# Patient Record
Sex: FEMALE | Race: WHITE | Employment: FULL TIME | URBAN - METROPOLITAN AREA
[De-identification: names, ages, dates, MRNs, and addresses within clinical notes are randomized per-mention and may not be internally consistent; named-entity substitution may affect disease eponyms.]

---

## 2023-11-22 ENCOUNTER — HOSPITAL ENCOUNTER (EMERGENCY)
Age: 23
Discharge: HOME | End: 2023-11-22
Payer: COMMERCIAL

## 2023-11-22 ENCOUNTER — APPOINTMENT (OUTPATIENT)
Dept: CT IMAGING | Age: 23
End: 2023-11-22
Payer: COMMERCIAL

## 2023-11-22 VITALS
SYSTOLIC BLOOD PRESSURE: 110 MMHG | BODY MASS INDEX: 26.06 KG/M2 | RESPIRATION RATE: 16 BRPM | HEIGHT: 67 IN | DIASTOLIC BLOOD PRESSURE: 77 MMHG | WEIGHT: 166 LBS | HEART RATE: 90 BPM | TEMPERATURE: 99.3 F | OXYGEN SATURATION: 98 %

## 2023-11-22 DIAGNOSIS — R82.71 BACTERIURIA: ICD-10-CM

## 2023-11-22 DIAGNOSIS — R10.9 LEFT FLANK PAIN: Primary | ICD-10-CM

## 2023-11-22 DIAGNOSIS — N30.91 HEMORRHAGIC CYSTITIS: ICD-10-CM

## 2023-11-22 LAB
ANION GAP SERPL CALC-SCNC: 12 MMOL/L (ref 2–11)
APPEARANCE UR: ABNORMAL
BACTERIA URNS QL MICRO: ABNORMAL /HPF
BASOPHILS # BLD: 0 K/UL (ref 0–0.2)
BASOPHILS NFR BLD: 0 % (ref 0–2)
BILIRUB UR QL: NEGATIVE
BUN SERPL-MCNC: 10 MG/DL (ref 6–23)
CALCIUM SERPL-MCNC: 9.5 MG/DL (ref 8.3–10.4)
CASTS URNS QL MICRO: 0 /LPF
CHLORIDE SERPL-SCNC: 102 MMOL/L (ref 98–107)
CO2 SERPL-SCNC: 26 MMOL/L (ref 21–32)
COLOR UR: ABNORMAL
CREAT SERPL-MCNC: 0.63 MG/DL (ref 0.6–1)
CRYSTALS URNS QL MICRO: 0 /LPF
DIFFERENTIAL METHOD BLD: ABNORMAL
EOSINOPHIL # BLD: 0 K/UL (ref 0–0.8)
EOSINOPHIL NFR BLD: 0 % (ref 0.5–7.8)
EPI CELLS #/AREA URNS HPF: ABNORMAL /HPF
ERYTHROCYTE [DISTWIDTH] IN BLOOD BY AUTOMATED COUNT: 12.2 % (ref 11.9–14.6)
GLUCOSE SERPL-MCNC: 119 MG/DL (ref 65–100)
GLUCOSE UR STRIP.AUTO-MCNC: NEGATIVE MG/DL
HCG UR QL: NEGATIVE
HCT VFR BLD AUTO: 42.8 % (ref 35.8–46.3)
HGB BLD-MCNC: 14.9 G/DL (ref 11.7–15.4)
HGB UR QL STRIP: ABNORMAL
IMM GRANULOCYTES # BLD AUTO: 0 K/UL (ref 0–0.5)
IMM GRANULOCYTES NFR BLD AUTO: 0 % (ref 0–5)
KETONES UR QL STRIP.AUTO: NEGATIVE MG/DL
LEUKOCYTE ESTERASE UR QL STRIP.AUTO: ABNORMAL
LYMPHOCYTES # BLD: 1.2 K/UL (ref 0.5–4.6)
LYMPHOCYTES NFR BLD: 9 % (ref 13–44)
MCH RBC QN AUTO: 31 PG (ref 26.1–32.9)
MCHC RBC AUTO-ENTMCNC: 34.8 G/DL (ref 31.4–35)
MCV RBC AUTO: 89 FL (ref 82–102)
MONOCYTES # BLD: 0.6 K/UL (ref 0.1–1.3)
MONOCYTES NFR BLD: 4 % (ref 4–12)
MUCOUS THREADS URNS QL MICRO: 0 /LPF
NEUTS SEG # BLD: 12.2 K/UL (ref 1.7–8.2)
NEUTS SEG NFR BLD: 87 % (ref 43–78)
NITRITE UR QL STRIP.AUTO: POSITIVE
NRBC # BLD: 0 K/UL (ref 0–0.2)
OTHER OBSERVATIONS: ABNORMAL
PH UR STRIP: 7 (ref 5–9)
PLATELET # BLD AUTO: 217 K/UL (ref 150–450)
PMV BLD AUTO: 10.2 FL (ref 9.4–12.3)
POTASSIUM SERPL-SCNC: 4 MMOL/L (ref 3.5–5.1)
PROT UR STRIP-MCNC: 100 MG/DL
RBC # BLD AUTO: 4.81 M/UL (ref 4.05–5.2)
RBC #/AREA URNS HPF: ABNORMAL /HPF
SODIUM SERPL-SCNC: 140 MMOL/L (ref 133–143)
SP GR UR REFRACTOMETRY: 1.02 (ref 1–1.02)
UROBILINOGEN UR QL STRIP.AUTO: 0.2 EU/DL (ref 0.2–1)
WBC # BLD AUTO: 14.1 K/UL (ref 4.3–11.1)
WBC URNS QL MICRO: ABNORMAL /HPF

## 2023-11-22 PROCEDURE — 74176 CT ABD & PELVIS W/O CONTRAST: CPT

## 2023-11-22 PROCEDURE — 2580000003 HC RX 258

## 2023-11-22 PROCEDURE — 85025 COMPLETE CBC W/AUTO DIFF WBC: CPT

## 2023-11-22 PROCEDURE — 81001 URINALYSIS AUTO W/SCOPE: CPT

## 2023-11-22 PROCEDURE — 96365 THER/PROPH/DIAG IV INF INIT: CPT

## 2023-11-22 PROCEDURE — 99284 EMERGENCY DEPT VISIT MOD MDM: CPT

## 2023-11-22 PROCEDURE — 96375 TX/PRO/DX INJ NEW DRUG ADDON: CPT

## 2023-11-22 PROCEDURE — 80048 BASIC METABOLIC PNL TOTAL CA: CPT

## 2023-11-22 PROCEDURE — 81025 URINE PREGNANCY TEST: CPT

## 2023-11-22 PROCEDURE — 87086 URINE CULTURE/COLONY COUNT: CPT

## 2023-11-22 PROCEDURE — 6360000002 HC RX W HCPCS

## 2023-11-22 PROCEDURE — 96376 TX/PRO/DX INJ SAME DRUG ADON: CPT

## 2023-11-22 RX ORDER — FLUCONAZOLE 150 MG/1
150 TABLET ORAL ONCE
Qty: 1 TABLET | Refills: 0 | Status: SHIPPED | OUTPATIENT
Start: 2023-11-22 | End: 2023-11-22

## 2023-11-22 RX ORDER — CEPHALEXIN 500 MG/1
500 CAPSULE ORAL 3 TIMES DAILY
Qty: 21 CAPSULE | Refills: 0 | Status: SHIPPED | OUTPATIENT
Start: 2023-11-22 | End: 2023-11-29

## 2023-11-22 RX ORDER — KETOROLAC TROMETHAMINE 15 MG/ML
15 INJECTION, SOLUTION INTRAMUSCULAR; INTRAVENOUS
Status: COMPLETED | OUTPATIENT
Start: 2023-11-22 | End: 2023-11-22

## 2023-11-22 RX ADMIN — CEFTRIAXONE 1000 MG: 1 INJECTION, POWDER, FOR SOLUTION INTRAMUSCULAR; INTRAVENOUS at 14:32

## 2023-11-22 RX ADMIN — KETOROLAC TROMETHAMINE 15 MG: 15 INJECTION, SOLUTION INTRAMUSCULAR; INTRAVENOUS at 13:43

## 2023-11-22 ASSESSMENT — PAIN - FUNCTIONAL ASSESSMENT: PAIN_FUNCTIONAL_ASSESSMENT: 0-10

## 2023-11-22 ASSESSMENT — PAIN SCALES - GENERAL: PAINLEVEL_OUTOF10: 5

## 2023-11-22 NOTE — DISCHARGE INSTRUCTIONS
There is no sign of kidney infection or kidney stones on your CT scan. I am treating you at home with antibiotics for urinary tract infection. Take the full course of Keflex prescribed you 3 times daily for the next 7 days. Take the fluconazole 1 time after you have finished the antibiotics. Return if you have any new or worsening symptoms. Your prescriptions were sent to [facility] on [id number]

## 2023-11-22 NOTE — ED PROVIDER NOTES
Emergency Department Provider Note       PCP: No primary care provider on file. Age: 20 y.o. Sex: female     DISPOSITION Decision To Discharge 11/22/2023 03:23:44 PM       ICD-10-CM    1. Left flank pain  R10.9       2. Bacteriuria  R82.71       3. Hemorrhagic cystitis  N30.91           Medical Decision Making     Complexity of Problems Addressed:  1 acute problem    Data Reviewed and Analyzed:   I independently ordered and reviewed each unique test.           I interpreted the CT Scan and agree with radiologist.  I interpreted the lab. Discussion of management or test interpretation. Presenting with concerns of left-sided flank and abdominal pain for the last 2 days with associated urinary frequency and dysuria. Passing some blood in urine more consistent with gross hematuria per photos that patient showed me. Will pursue labs and CT imaging workup to assess for renal stone versus other urinary versus renal abnormality. Bacteriuria 3+. Positive blood and nitrites. hCG negative. CT abdomen pelvis renal stone protocol demonstrates unremarkable kidneys with bladder partially distended with wall showing circumferential thickening and mild adjacent fatty stranding correlating with acute cystitis. No evidence of pyelonephritis or kidney/ureteral stones. Given dose of IV Rocephin in ED course and will culture urine. Provide outpatient course of cephalexin for treatment of UTI. Discussed return precautions and patient stable for discharge. Risk of Complications and/or Morbidity of Patient Management:  Prescription drug management performed. Patient was discharged risks and benefits of hospitalization were considered. Shared medical decision making was utilized in creating the patients health plan today. Is this patient to be included in the SEP-1 core measure due to severe sepsis or septic shock?  No Exclusion criteria - the patient is NOT to be included for SEP-1 Core Measure due to:

## 2023-11-22 NOTE — ED TRIAGE NOTES
Pt ambulatory to triage. Pt reports urinary pain and urgency that started Monday and blood in urine yesterday and passed a blood clot in her urine yesterday. Pt also reports pain on left side under rib that started yesterday with worsening pain today. Pt states she has a history of UTI's. Pt states she has been taking Azo and cranberry.

## 2023-11-26 LAB
BACTERIA SPEC CULT: ABNORMAL
SERVICE CMNT-IMP: ABNORMAL